# Patient Record
Sex: FEMALE | Race: BLACK OR AFRICAN AMERICAN | NOT HISPANIC OR LATINO | URBAN - METROPOLITAN AREA
[De-identification: names, ages, dates, MRNs, and addresses within clinical notes are randomized per-mention and may not be internally consistent; named-entity substitution may affect disease eponyms.]

---

## 2023-02-19 ENCOUNTER — HOSPITAL ENCOUNTER (EMERGENCY)
Facility: HOSPITAL | Age: 43
Discharge: HOME | End: 2023-02-19
Attending: EMERGENCY MEDICINE

## 2023-02-19 VITALS
HEIGHT: 71 IN | RESPIRATION RATE: 17 BRPM | HEART RATE: 76 BPM | SYSTOLIC BLOOD PRESSURE: 127 MMHG | OXYGEN SATURATION: 98 % | TEMPERATURE: 98 F | BODY MASS INDEX: 32.9 KG/M2 | DIASTOLIC BLOOD PRESSURE: 67 MMHG | WEIGHT: 235 LBS

## 2023-02-19 DIAGNOSIS — S61.210A LACERATION OF RIGHT INDEX FINGER WITHOUT FOREIGN BODY WITHOUT DAMAGE TO NAIL, INITIAL ENCOUNTER: Primary | ICD-10-CM

## 2023-02-19 PROBLEM — Z96.641 STATUS POST TOTAL REPLACEMENT OF RIGHT HIP: Status: ACTIVE | Noted: 2021-05-21

## 2023-02-19 PROBLEM — M25.551 CHRONIC PAIN OF RIGHT HIP: Status: ACTIVE | Noted: 2023-02-19

## 2023-02-19 PROBLEM — E55.9 VITAMIN D DEFICIENCY: Status: ACTIVE | Noted: 2021-05-21

## 2023-02-19 PROBLEM — M75.102 TEAR OF LEFT SUPRASPINATUS TENDON: Status: ACTIVE | Noted: 2022-05-17

## 2023-02-19 PROBLEM — J30.9 ALLERGIC RHINITIS: Status: ACTIVE | Noted: 2020-10-12

## 2023-02-19 PROBLEM — M54.16 LUMBAR RADICULOPATHY: Status: ACTIVE | Noted: 2023-02-19

## 2023-02-19 PROBLEM — Z98.51 STATUS POST TUBAL LIGATION: Status: ACTIVE | Noted: 2018-05-11

## 2023-02-19 PROBLEM — F41.9 ANXIETY: Status: ACTIVE | Noted: 2019-04-29

## 2023-02-19 PROBLEM — M75.42 IMPINGEMENT SYNDROME OF LEFT SHOULDER: Status: ACTIVE | Noted: 2022-06-03

## 2023-02-19 PROBLEM — M17.0 ARTHRITIS OF BOTH KNEES: Status: ACTIVE | Noted: 2022-12-14

## 2023-02-19 PROBLEM — G89.29 CHRONIC PAIN OF RIGHT HIP: Status: ACTIVE | Noted: 2023-02-19

## 2023-02-19 PROBLEM — R87.610 ASCUS OF CERVIX WITH NEGATIVE HIGH RISK HPV: Status: ACTIVE | Noted: 2017-02-07

## 2023-02-19 PROBLEM — J45.30 MILD PERSISTENT ASTHMA WITHOUT COMPLICATION: Status: ACTIVE | Noted: 2020-10-12

## 2023-02-19 PROBLEM — F51.01 PRIMARY INSOMNIA: Status: ACTIVE | Noted: 2019-04-29

## 2023-02-19 PROBLEM — R56.9 SEIZURE: Status: ACTIVE | Noted: 2020-10-09

## 2023-02-19 PROBLEM — G89.29 CHRONIC RIGHT-SIDED LOW BACK PAIN WITH RIGHT-SIDED SCIATICA: Status: ACTIVE | Noted: 2021-06-11

## 2023-02-19 PROBLEM — M19.012 ARTHRITIS OF LEFT ACROMIOCLAVICULAR JOINT: Status: ACTIVE | Noted: 2022-06-03

## 2023-02-19 PROBLEM — M54.41 CHRONIC RIGHT-SIDED LOW BACK PAIN WITH RIGHT-SIDED SCIATICA: Status: ACTIVE | Noted: 2021-06-11

## 2023-02-19 PROCEDURE — 99282 EMERGENCY DEPT VISIT SF MDM: CPT | Mod: 25

## 2023-02-19 PROCEDURE — 25000003 PHARM REV CODE 250: Performed by: PHYSICIAN ASSISTANT

## 2023-02-19 PROCEDURE — 12001 RPR S/N/AX/GEN/TRNK 2.5CM/<: CPT

## 2023-02-19 RX ORDER — LIDOCAINE HYDROCHLORIDE 10 MG/ML
5 INJECTION INFILTRATION; PERINEURAL
Status: COMPLETED | OUTPATIENT
Start: 2023-02-19 | End: 2023-02-19

## 2023-02-19 RX ADMIN — LIDOCAINE HYDROCHLORIDE 5 ML: 10 INJECTION, SOLUTION INFILTRATION; PERINEURAL at 04:02

## 2023-02-19 NOTE — ED TRIAGE NOTES
Parker Fitzpatrick, a 42 y.o. female presents to the ED w/ complaint of laceration to right index finger after cutting it on a rack in [employer]. Pt states her tetanus is UTD. Pt denies any other s/s. No active bleeding noted. Pt wearing band-aid over laceration. Pt is AAOX4, VSS, and NADN.    Triage note:  Chief Complaint   Patient presents with    Laceration     The patient reports that she cut her right 2nd digit on a rack at the [employer]. Patient states that she had a tetanus shot in 2016.Denies numbness, tingling. No active bleeding noted in triage. Laceration coved with band-aids.      Review of patient's allergies indicates:   Allergen Reactions    Adhesive tape-silicones      Other reaction(s): Dermatological problems, e.g., rash, hives  ALL TAPE ADHESIVES but not on LIST!!!!                                     Cephalosporins Other (See Comments)     unknown  Other reaction(s): rash and burn like symptoms                                       Ranitidine Other (See Comments)     unknown  Other reaction(s): dermatological                                       Amoxicillin     Azithromycin     Latex, natural rubber Other (See Comments)     unknown  Other reaction(s): Hives  ALL LATEX BUT NOT IN LIST!!!!!!!!!!!!!!!!                                       Levocetirizine Other (See Comments)     unknown  hives      Milk containing products     Montelukast Other (See Comments)     Unknown      Neosporin [hydrocortisone]     Penicillins     Red dye     Sulfa (sulfonamide antibiotics)      No past medical history on file.

## 2023-02-19 NOTE — FIRST PROVIDER EVALUATION
Emergency Department TeleTriage Encounter Note      CHIEF COMPLAINT    Chief Complaint   Patient presents with    Laceration     The patient reports that she cut her right 2nd digit on a rack at the [employer]. Patient states that she had a tetanus shot in 2016.Denies numbness, tingling. No active bleeding noted in triage. Laceration coved with band-aids.        VITAL SIGNS   Initial Vitals [02/19/23 1558]   BP Pulse Resp Temp SpO2   129/66 73 16 97.8 °F (36.6 °C) 97 %      MAP       --            ALLERGIES    Review of patient's allergies indicates:   Allergen Reactions    Amoxicillin     Azithromycin     Milk containing products     Neosporin [hydrocortisone]     Penicillins     Red dye     Sulfa (sulfonamide antibiotics)        PROVIDER TRIAGE NOTE  Patient with laceration to right 2nd digit. Tetanus up to date.       ORDERS  Labs Reviewed - No data to display    ED Orders (720h ago, onward)      None              Virtual Visit Note: The provider triage portion of this emergency department evaluation and documentation was performed via [employer], a HIPAA-compliant telemedicine application, in concert with a tele-presenter in the room. A face to face patient evaluation with one of my colleagues will occur once the patient is placed in an emergency department room.      DISCLAIMER: This note was prepared with [employer] voice recognition transcription software. Garbled syntax, mangled pronouns, and other bizarre constructions may be attributed to that software system.

## 2023-02-19 NOTE — DISCHARGE INSTRUCTIONS

## 2023-02-19 NOTE — ED PROVIDER NOTES
Encounter Date: 2/19/2023    SCRIBE #1 NOTE: I, Huma Pinzon, am scribing for, and in the presence of,  Nicholas Rose PA-C. I have scribed the following portions of the note - Other sections scribed: HPI,ROS.     History     Chief Complaint   Patient presents with    Laceration     The patient reports that she cut her right 2nd digit on a rack at the [employer]. Patient states that she had a tetanus shot in 2016.Denies numbness, tingling. No active bleeding noted in triage. Laceration coved with band-aids.      Parker Fitzpatrick is a 42-year old female with no pertinent PMHx, who presents to the ED with complaints of a wound to the knuckle of her right 2nd digit today. Patient reports cutting her finger on a rack at the [employer]. She states associated symptoms of tingling and stinging. Patient states having her tetanus shot in 2016. She attempted tx with a band-aid. Denies any numbness or other associated symptoms. No other alleviating factors.       The history is provided by the patient. No  was used.   Review of patient's allergies indicates:   Allergen Reactions    Adhesive tape-silicones      Other reaction(s): Dermatological problems, e.g., rash, hives  ALL TAPE ADHESIVES but not on LIST!!!!                                     Cephalosporins Other (See Comments)     unknown  Other reaction(s): rash and burn like symptoms                                       Ranitidine Other (See Comments)     unknown  Other reaction(s): dermatological                                       Amoxicillin     Azithromycin     Latex, natural rubber Other (See Comments)     unknown  Other reaction(s): Hives  ALL LATEX BUT NOT IN LIST!!!!!!!!!!!!!!!!                                       Levocetirizine Other (See Comments)     unknown  hives      Milk containing products     Montelukast Other (See Comments)     Unknown      Neosporin [hydrocortisone]     Penicillins     Red dye     Sulfa (sulfonamide  antibiotics)      No past medical history on file.  No past surgical history on file.  No family history on file.     Review of Systems   Constitutional:  Negative for fever.   HENT:  Negative for sore throat.    Respiratory:  Negative for shortness of breath.    Cardiovascular:  Negative for chest pain.   Gastrointestinal:  Negative for nausea.   Genitourinary:  Negative for dysuria.   Musculoskeletal:  Negative for back pain.   Skin:  Positive for wound (right 2nd digit). Negative for rash.   Neurological:  Negative for weakness and numbness.        (+) Tingling and stinging    Hematological:  Does not bruise/bleed easily.   All other systems reviewed and are negative.    Physical Exam     Initial Vitals [02/19/23 1558]   BP Pulse Resp Temp SpO2   129/66 73 16 97.8 °F (36.6 °C) 97 %      MAP       --         Physical Exam    Nursing note and vitals reviewed.  Constitutional: She appears well-developed and well-nourished. She is not diaphoretic. No distress.   HENT:   Head: Atraumatic.   Right Ear: External ear normal.   Left Ear: External ear normal.   Eyes: Conjunctivae and EOM are normal.   Neck: No tracheal deviation present.   Normal range of motion.  Cardiovascular:  Normal rate and regular rhythm.           Pulmonary/Chest: No accessory muscle usage or stridor. No tachypnea. No respiratory distress.   Musculoskeletal:         General: No edema. Normal range of motion.      Cervical back: Normal range of motion.      Comments: 1 cm superficial laceration over the D IP joint of the right index finger.  Full ROM of digit, including against resistance.  Capillary refill less than 2 seconds.  Sensation intact and equal to both radial and ulnar surfaces.  No foreign bodies.     Neurological: She is alert and oriented to person, place, and time. She displays no tremor. She displays no seizure activity. Coordination and gait normal.   Skin: Skin is intact. Capillary refill takes less than 2 seconds. No rash noted.  No erythema.       ED Course   Lac Repair    Date/Time: 2/19/2023 5:04 PM  Performed by: Nicholas Rose PA-C  Authorized by: Hugo Garcia MD     Consent:     Consent obtained:  Verbal    Consent given by:  Patient  Anesthesia:     Anesthesia method:  Nerve block    Block needle gauge:  27 G    Block anesthetic:  Lidocaine 1% w/o epi  Laceration details:     Length (cm):  1  Treatment:     Area cleansed with:  Saline  Skin repair:     Repair method:  Sutures    Suture size:  4-0    Suture material:  Nylon    Number of sutures:  3  Approximation:     Approximation:  Close  Repair type:     Repair type:  Simple  Post-procedure details:     Dressing:  Non-adherent dressing and splint for protection    Procedure completion:  Tolerated well, no immediate complications  Labs Reviewed - No data to display       Imaging Results    None          Medications   LIDOcaine HCL 10 mg/ml (1%) injection 5 mL (has no administration in time range)     Medical Decision Making:   History:   Old Medical Records: I decided to obtain old medical records.  ED Management:    This is an emergent evaluation of a 42 y.o. female presenting to the ED for a laceration. Denies fever and numbness. Afebrile. Patient is non-toxic appearing and in no acute distress. No evidence of foreign body. No bony tenderness. No neurovascular compromise or injury. No evidence of tendon disruption or ligamentous injury. No evidence for infection at this time.     Laceration will require closure to achieve and maintain hemostasis and to promote optimal wound healing after the wound is copiously irrigated at high pressure.Tetanus is UTD. Dressed with antibiotic ointment and non-adherent dressing. Instructed to follow up with PCP for reevaluation and suture removal in 14 days.       I discussed with the patient the diagnosis, treatment plan, indications for return to the emergency department, and for expected follow-up. The patient verbalized an understanding.  The patient is asked if there are any questions or concerns. We discuss the case, until all issues are addressed to the patient's satisfaction. Patient understands and is agreeable to the plan.               Scribe Attestation:   Scribe #1: I performed the above scribed service and the documentation accurately describes the services I performed. I attest to the accuracy of the note.                 Scribe attestation: I, Nicholas Rose, personally performed the services described in this documentation. All medical record entries made by the scribe were at my direction and in my presence.  I have reviewed the chart and agree that the record reflects my personal performance and is accurate and complete   Clinical Impression:   Final diagnoses:  [S63.171A] Laceration of right index finger without foreign body without damage to nail, initial encounter (Primary)        ED Disposition Condition    Discharge Stable          ED Prescriptions    None       Follow-up Information       Follow up With Specialties Details Why Contact Info    [facility]  Schedule an appointment as soon as possible for a visit in 2 weeks For reevaluation, AND for suture/staple removal in 14 days [address] [place] [address]  [phone]      [facility] [place] Emergency Dept Emergency Medicine Go to  If symptoms worsen [address] [facility] [id number] [phone]             Nicholas Rose PA-C  02/19/23 8149